# Patient Record
Sex: FEMALE | Race: WHITE | NOT HISPANIC OR LATINO | URBAN - METROPOLITAN AREA
[De-identification: names, ages, dates, MRNs, and addresses within clinical notes are randomized per-mention and may not be internally consistent; named-entity substitution may affect disease eponyms.]

---

## 2023-05-25 ENCOUNTER — APPOINTMENT (RX ONLY)
Dept: URBAN - METROPOLITAN AREA CLINIC 146 | Facility: CLINIC | Age: 78
Setting detail: DERMATOLOGY
End: 2023-05-25

## 2023-05-25 DIAGNOSIS — L81.4 OTHER MELANIN HYPERPIGMENTATION: ICD-10-CM

## 2023-05-25 DIAGNOSIS — D18.0 HEMANGIOMA: ICD-10-CM

## 2023-05-25 DIAGNOSIS — L82.1 OTHER SEBORRHEIC KERATOSIS: ICD-10-CM

## 2023-05-25 DIAGNOSIS — L57.0 ACTINIC KERATOSIS: ICD-10-CM

## 2023-05-25 DIAGNOSIS — L57.8 OTHER SKIN CHANGES DUE TO CHRONIC EXPOSURE TO NONIONIZING RADIATION: ICD-10-CM

## 2023-05-25 DIAGNOSIS — L20.89 OTHER ATOPIC DERMATITIS: ICD-10-CM

## 2023-05-25 DIAGNOSIS — L82.0 INFLAMED SEBORRHEIC KERATOSIS: ICD-10-CM

## 2023-05-25 PROBLEM — L20.84 INTRINSIC (ALLERGIC) ECZEMA: Status: ACTIVE | Noted: 2023-05-25

## 2023-05-25 PROBLEM — D18.01 HEMANGIOMA OF SKIN AND SUBCUTANEOUS TISSUE: Status: ACTIVE | Noted: 2023-05-25

## 2023-05-25 PROCEDURE — ? LIQUID NITROGEN

## 2023-05-25 PROCEDURE — 99213 OFFICE O/P EST LOW 20 MIN: CPT | Mod: 25

## 2023-05-25 PROCEDURE — 17000 DESTRUCT PREMALG LESION: CPT | Mod: 59

## 2023-05-25 PROCEDURE — 17003 DESTRUCT PREMALG LES 2-14: CPT | Mod: 59

## 2023-05-25 PROCEDURE — ? COUNSELING

## 2023-05-25 PROCEDURE — 17110 DESTRUCTION B9 LES UP TO 14: CPT

## 2023-05-25 ASSESSMENT — LOCATION DETAILED DESCRIPTION DERM
LOCATION DETAILED: LEFT INFERIOR CENTRAL MALAR CHEEK
LOCATION DETAILED: RIGHT ANTERIOR PROXIMAL THIGH
LOCATION DETAILED: EPIGASTRIC SKIN
LOCATION DETAILED: RIGHT CENTRAL TEMPLE
LOCATION DETAILED: RIGHT RADIAL DORSAL HAND

## 2023-05-25 ASSESSMENT — LOCATION ZONE DERM
LOCATION ZONE: LEG
LOCATION ZONE: TRUNK
LOCATION ZONE: HAND
LOCATION ZONE: FACE

## 2023-05-25 ASSESSMENT — LOCATION SIMPLE DESCRIPTION DERM
LOCATION SIMPLE: ABDOMEN
LOCATION SIMPLE: RIGHT TEMPLE
LOCATION SIMPLE: LEFT CHEEK
LOCATION SIMPLE: RIGHT THIGH
LOCATION SIMPLE: RIGHT HAND

## 2023-05-25 NOTE — PROCEDURE: LIQUID NITROGEN
Post-Care Instructions: I reviewed with the patient in detail post-care instructions. Patient is to wear sunprotection, and avoid picking at any of the treated lesions. Pt may apply Vaseline to crusted or scabbing areas.
Duration Of Freeze Thaw-Cycle (Seconds): 3
Number Of Freeze-Thaw Cycles: 1 freeze-thaw cycle
Detail Level: Detailed
Render Post-Care Instructions In Note?: no
Show Aperture Variable?: Yes
Consent: The patient's consent was obtained including but not limited to risks of crusting, scabbing, blistering, scarring, darker or lighter pigmentary change, recurrence, incomplete removal and infection.
Spray Paint Text: The liquid nitrogen was applied to the skin utilizing a spray paint frosting technique.
Medical Necessity Clause: This procedure was medically necessary because the lesions that were treated were:
Medical Necessity Information: It is in your best interest to select a reason for this procedure from the list below. All of these items fulfill various CMS LCD requirements except the new and changing color options.

## 2025-06-24 NOTE — HPI: EVALUATION OF SKIN LESION(S)
Subjective:      Michela is a 76 y.o. female who comes for follow-up of sinusitis.  Her last visit with me was on 4/21/2025.  Culture grew acinetobacter and abx changed to Bactrim.    Patient reports developing another sinus infection about 2 weeks after finishing Bactrim.  Patient was placed on Z-Lauro which resolved her symptoms.  Patient then reports recent trip to [place] and having a resurgence of the coughing, headache/facial pressure, nasal congestion, and discolored nasal discharge within the last week.  Patient is on day 3 of Z-Lauro and reports her headache and other symptoms have improved significantly.  She reports compliance with budesonide rinses.    Per last office visit 4/21/2025 :  Patient reports daily postnasal drip with associated cough, throat clearing, and productive mucus.  Patient also reports chronic anosmia with sneezing and rhinorrhea.  She denies any recent episodes of acid reflux and denies any dysphagia.  She was recently started on Pepcid at night and has been compliant with the last 3 weeks without significant improvement.  Patient is on budesonide irrigations twice daily.       Triggers for the cough include the following:   - voice use:  yes  - breathing heavily:  no  - laughing:  no  - eating:  no  - drinking cold liquids:  no  - strong odors:  yes     - post-prandial:  no  - lying down:  no     Taking an ARB/ACEI: yes     Current sinonasal medications as above.  The last course of antibiotics was a long time ago.    She recalls previously having allergy testingrecently which was negative. Per chart review, positive testing in 2019 with multiple positives.  3/13/25 inhalant immunocaps with IgE 36 and no significant positives.   She relates a history of asthma which is currently managed with Symbicort.  She denies a history of reflux symptomsbut currently taking pepcid 20 mg PO QHS.    She denies a diagnosis of obstructive sleep apnea.   She does not recall a prior history of nasal  Hpi Title: Evaluation of Skin Lesions trauma.  She has previously had sinonasal surgery consisting of FESS with Dr. Boo on 9/22/2017 and prior BSP.    She has not had a tonsillectomy.  She is not a tobacco smoker.     1. Chronic pansinusitis  2. Chronic cough  3. Perennial allergic rhinitis with seasonal variation  4. S/P FESS (functional endoscopic sinus surgery)  Laryngoscopy with evidence of left maxillary sinusitis.  I will empirically treat patient with Augmentin based on prior culture showing BLP H. Flu.  Prior sinus cultures did grow Pseudomonas, but mucopurulence does not appear like typical Pseudomonas.   I swabbed her sinus exudate for culture and will use the results to direct antibiotic therapy as indicated.  Continue budesonide rinses BID  Finish pepcid  Follow up in 2 months    The patient's medications, allergies, past medical, surgical, social and family histories were reviewed and updated as appropriate.    A detailed review of systems was obtained with pertinent positives as per the above HPI, and otherwise negative.        Objective:     /79 (BP Location: Right arm, Patient Position: Sitting)   Pulse 93   Wt 80.7 kg (177 lb 14.6 oz)   BMI 29.61 kg/m²        Constitutional:   She appears well-developed and well-nourished. Normal speech.      Head:  Normocephalic and atraumatic. Facial strength is normal.      Nose:  No mucosal edema, rhinorrhea, septal deviation or polyps.  No foreign bodies. Turbinate hypertrophy.  Turbinates normal and no turbinate masses.  Right sinus exhibits no maxillary sinus tenderness and no frontal sinus tenderness. Left sinus exhibits no maxillary sinus tenderness and no frontal sinus tenderness.     Mouth/Throat  Oropharynx clear and moist without lesions or asymmetry, normal uvula midline and lips, teeth, and gums normal. She has dentures. No trismus or mucous membrane lesions. No oropharyngeal exudate, posterior oropharyngeal edema or posterior oropharyngeal erythema. Tonsils not present.       Neck:  Phonation normal. No thyroid mass and no thyromegaly present.     She has no cervical adenopathy.     Pulmonary/Chest:   Effort normal.     Psychiatric:   She has a normal mood and affect. Her speech is normal and behavior is normal.         Procedure    Nasal endoscopy performed.  See procedure note.    Data Reviewed    WBC (K/uL)   Date Value   03/14/2025 4.67     Eosinophil % (%)   Date Value   03/14/2025 0.6     Eos # (K/uL)   Date Value   03/14/2025 0.0     Platelets (K/uL)   Date Value   03/14/2025 282     Glucose (mg/dL)   Date Value   02/04/2025 88     Total IgE (IU/mL)   Date Value   03/14/2025 36.3     Prior sinus cultures have grown mostly Pseudomonas but did grow beta lactamase positive H. Flu in 2019.  Surgery with +p. Acnes.     I independently reviewed the images of the CT sinuses dated 6/30/2020. Pertinent findings include surgical changes with extensive mucosal thickening throughout all sinuses.       Assessment:     1. Chronic pansinusitis    2. Perennial allergic rhinitis with seasonal variation    3. Chronic cough    4. S/P FESS (functional endoscopic sinus surgery)    5. Other chronic sinusitis         Plan:     Recurrent sinusitis with evidence of pansinusitis on exam today.  She has completed three rounds of abx in the last three months.  Will attempt treatment with topical abx.    Rx Tobramycin rinse  CT sinuses  F/u with Dr. Boo    She will follow up as needed  I had a discussion with the patient regarding her condition and the further workup and management options.    All questions were answered, and the patient is in agreement with the above.     Disclaimer:  This note may have been prepared utilizing voice recognition software which may result in occasional typographical errors in the text such as sound alike words.   If further clarification is needed, please contact the ENT department of [facility].